# Patient Record
Sex: MALE | Race: BLACK OR AFRICAN AMERICAN | NOT HISPANIC OR LATINO | Employment: UNEMPLOYED | URBAN - METROPOLITAN AREA
[De-identification: names, ages, dates, MRNs, and addresses within clinical notes are randomized per-mention and may not be internally consistent; named-entity substitution may affect disease eponyms.]

---

## 2019-07-30 ENCOUNTER — HOSPITAL ENCOUNTER (EMERGENCY)
Facility: HOSPITAL | Age: 24
Discharge: HOME | End: 2019-07-30
Attending: SURGERY
Payer: COMMERCIAL

## 2019-07-30 VITALS
TEMPERATURE: 99 F | HEIGHT: 73 IN | SYSTOLIC BLOOD PRESSURE: 135 MMHG | RESPIRATION RATE: 18 BRPM | DIASTOLIC BLOOD PRESSURE: 69 MMHG | BODY MASS INDEX: 28.1 KG/M2 | WEIGHT: 212 LBS | OXYGEN SATURATION: 97 % | HEART RATE: 85 BPM

## 2019-07-30 DIAGNOSIS — L02.31 ABSCESS OF LEFT BUTTOCK: Primary | ICD-10-CM

## 2019-07-30 PROCEDURE — 25000003 PHARM REV CODE 250: Mod: ER | Performed by: PHYSICIAN ASSISTANT

## 2019-07-30 PROCEDURE — 25000003 PHARM REV CODE 250: Mod: ER

## 2019-07-30 PROCEDURE — 10061 I&D ABSCESS COMP/MULTIPLE: CPT | Mod: ER

## 2019-07-30 PROCEDURE — 99283 EMERGENCY DEPT VISIT LOW MDM: CPT | Mod: 25,ER

## 2019-07-30 PROCEDURE — 87070 CULTURE OTHR SPECIMN AEROBIC: CPT | Mod: ER

## 2019-07-30 RX ORDER — LIDOCAINE HYDROCHLORIDE 10 MG/ML
INJECTION, SOLUTION EPIDURAL; INFILTRATION; INTRACAUDAL; PERINEURAL
Status: COMPLETED
Start: 2019-07-30 | End: 2019-07-30

## 2019-07-30 RX ORDER — KETOROLAC TROMETHAMINE 10 MG/1
10 TABLET, FILM COATED ORAL
Status: COMPLETED | OUTPATIENT
Start: 2019-07-30 | End: 2019-07-30

## 2019-07-30 RX ORDER — LIDOCAINE HYDROCHLORIDE 10 MG/ML
10 INJECTION INFILTRATION; PERINEURAL
Status: COMPLETED | OUTPATIENT
Start: 2019-07-30 | End: 2019-07-30

## 2019-07-30 RX ADMIN — LIDOCAINE HYDROCHLORIDE 10 ML: 10 INJECTION INFILTRATION; PERINEURAL at 11:07

## 2019-07-30 RX ADMIN — KETOROLAC TROMETHAMINE 10 MG: 10 TABLET, FILM COATED ORAL at 10:07

## 2019-07-30 RX ADMIN — LIDOCAINE HYDROCHLORIDE 10 ML: 10 INJECTION, SOLUTION EPIDURAL; INFILTRATION; INTRACAUDAL; PERINEURAL at 11:07

## 2019-07-30 NOTE — ED PROVIDER NOTES
Encounter Date: 7/30/2019       History     Chief Complaint   Patient presents with    Abscess     24-year-old male presents to the emergency department for evaluation of 4 day history of abscess to the left side of his buttocks.  He reports that the symptoms began gradually 4 days ago and have been constant since onset.  He reports that he did have a previous abscess that open and drained on its own and only required antibiotics.  He reports that yesterday he was evaluated at [facility] Emergency [facility] and was given Bactrim, metronidazole and tramadol.  He reports that he took 1 dose of these medications last night and has not taken his dosage this morning.  He reports a constant, throbbing, achy pain. He reports he has not taken the tramadol or any over-the-counter medications in an attempt to help the pain today.  He denies any fever, headache, dizziness, vision changes, abdominal pain, chest pain, nausea, vomiting or diarrhea.  No treatment was attempted prior to arrival.        Review of patient's allergies indicates:  No Known Allergies  No past medical history on file.  No past surgical history on file.  No family history on file.  Social History     Tobacco Use    Smoking status: Not on file   Substance Use Topics    Alcohol use: Not on file    Drug use: Not on file     Review of Systems   Constitutional: Negative for activity change, appetite change and fever.   HENT: Negative for congestion, rhinorrhea, sore throat, trouble swallowing and voice change.    Eyes: Negative for photophobia and visual disturbance.   Respiratory: Negative for cough and shortness of breath.    Cardiovascular: Negative for chest pain.   Gastrointestinal: Negative for abdominal pain, constipation, diarrhea, nausea and vomiting.   Genitourinary: Negative for decreased urine volume and dysuria.   Musculoskeletal: Negative for back pain.   Skin: Positive for wound. Negative for rash.   Neurological: Negative for  dizziness, weakness, light-headedness, numbness and headaches.   Hematological: Does not bruise/bleed easily.       Physical Exam     Initial Vitals [07/30/19 1033]   BP Pulse Resp Temp SpO2   135/69 85 18 99 °F (37.2 °C) 97 %      MAP       --         Physical Exam    Nursing note and vitals reviewed.  Constitutional: He appears well-developed and well-nourished. He is not diaphoretic. No distress.   HENT:   Head: Normocephalic and atraumatic.   Right Ear: External ear normal.   Left Ear: External ear normal.   Mouth/Throat: Oropharynx is clear and moist. No oropharyngeal exudate.   Eyes: Conjunctivae and EOM are normal. Pupils are equal, round, and reactive to light.   Neck: Normal range of motion. Neck supple.   Cardiovascular: Normal rate, regular rhythm and normal heart sounds.   Pulmonary/Chest: Breath sounds normal. No respiratory distress. He has no wheezes. He has no rhonchi. He has no rales. He exhibits no tenderness.   Abdominal: Soft. He exhibits no distension. There is no tenderness. There is no guarding.   Lymphadenopathy:     He has no cervical adenopathy.   Neurological: He is alert and oriented to person, place, and time.   Skin: Skin is warm and dry. Abscess noted.        Psychiatric: He has a normal mood and affect.         ED Course   I & D - Incision and Drainage  Date/Time: 7/30/2019 11:29 AM  Performed by: Tonya Ambrocio PA-C  Authorized by: DIANDRA Lao III, MD   Type: abscess  Location: left buttock.  Anesthesia: local infiltration    Anesthesia:  Local Anesthetic: lidocaine 1% without epinephrine  Scalpel size: 11  Complexity: complex  Drainage: purulent and  bloody  Drainage amount: copious  Wound treatment: incision,  drainage and  deloculation  Complications: No  Patient tolerance: Patient tolerated the procedure well with no immediate complications        Labs Reviewed - No data to display       Imaging Results    None          Medical Decision Making:   Initial Assessment:    24-year-old male presents for evaluation of abscess to his left buttocks.  Physical exam reveals a nontoxic-appearing male in no acute distress. Patient is afebrile vital signs within limits.  Neurological exam reveals an alert and oriented patient.  Neck is supple, nontender to palpation. Lungs clear to auscultation bilaterally. No respiratory distress or accessory muscle use noted.  Abdominal exam reveals a soft abdomen, nontender to palpation. No CVA tenderness noted. Skin exam reveals 3 x 3 cm well-circumscribed abscess noted to the left buttocks with surrounding induration noted. Fluctuance noted. No vesicles, pustules or bulla noted. No ulcerations noted.  Abscess is not associated with the perirectal area.  I carefully considered but doubt serious etiology including perirectal abscess a deep tracking abscess.  Differential Diagnosis:   Abscess to the left buttocks    ED Management:  Wound was incised and drained in the emergency department without complication.  Instructed the patient to continue taking the antibiotics prescribed him yesterday.  Instructed the patient to return to the emergency department immediately for any new or worsening symptoms.                      Clinical Impression:       ICD-10-CM ICD-9-CM   1. Abscess of left buttock L02.31 682.5                                Tonya Ambrocio PA-C  07/30/19 1324

## 2019-07-30 NOTE — DISCHARGE INSTRUCTIONS
You are instructed to continue taking the antibiotics and pain medication prescribed you yesterday.  You are instructed to follow up with  Your primary care provider for re-evaluation within 3 days.  You are instructed to return to the emergency department immediately for any new or worsening symptoms.

## 2019-08-02 LAB — BACTERIA SPEC AEROBE CULT: NO GROWTH
